# Patient Record
Sex: MALE | Race: BLACK OR AFRICAN AMERICAN | NOT HISPANIC OR LATINO | URBAN - METROPOLITAN AREA
[De-identification: names, ages, dates, MRNs, and addresses within clinical notes are randomized per-mention and may not be internally consistent; named-entity substitution may affect disease eponyms.]

---

## 2020-01-01 ENCOUNTER — OFFICE (OUTPATIENT)
Dept: URBAN - METROPOLITAN AREA CLINIC 19 | Facility: CLINIC | Age: 60
End: 2020-01-01
Payer: COMMERCIAL

## 2020-01-01 ENCOUNTER — AMBULATORY SURGICAL CENTER (OUTPATIENT)
Dept: URBAN - METROPOLITAN AREA SURGERY 2 | Facility: SURGERY | Age: 60
End: 2020-01-01
Payer: COMMERCIAL

## 2020-01-01 ENCOUNTER — AMBULATORY SURGICAL CENTER (OUTPATIENT)
Dept: URBAN - METROPOLITAN AREA SURGERY 2 | Facility: SURGERY | Age: 60
End: 2020-01-01

## 2020-01-01 VITALS
DIASTOLIC BLOOD PRESSURE: 60 MMHG | SYSTOLIC BLOOD PRESSURE: 102 MMHG | OXYGEN SATURATION: 91 % | HEART RATE: 74 BPM | HEIGHT: 65 IN | WEIGHT: 97 LBS

## 2020-01-01 DIAGNOSIS — K63.89 OTHER SPECIFIED DISEASES OF INTESTINE: ICD-10-CM

## 2020-01-01 DIAGNOSIS — K86.1 OTHER CHRONIC PANCREATITIS: ICD-10-CM

## 2020-01-01 DIAGNOSIS — R63.4 ABNORMAL WEIGHT LOSS: ICD-10-CM

## 2020-01-01 DIAGNOSIS — K70.30 ALCOHOLIC CIRRHOSIS OF LIVER WITHOUT ASCITES: ICD-10-CM

## 2020-01-01 DIAGNOSIS — D63.8 ANEMIA IN OTHER CHRONIC DISEASES CLASSIFIED ELSEWHERE: ICD-10-CM

## 2020-01-01 DIAGNOSIS — Z80.0 FAMILY HISTORY OF MALIGNANT NEOPLASM OF DIGESTIVE ORGANS: ICD-10-CM

## 2020-01-01 DIAGNOSIS — K21.9 GASTRO-ESOPHAGEAL REFLUX DISEASE WITHOUT ESOPHAGITIS: ICD-10-CM

## 2020-01-01 DIAGNOSIS — K52.9 NONINFECTIVE GASTROENTERITIS AND COLITIS, UNSPECIFIED: ICD-10-CM

## 2020-01-01 DIAGNOSIS — R11.0 NAUSEA: ICD-10-CM

## 2020-01-01 DIAGNOSIS — K44.9 DIAPHRAGMATIC HERNIA WITHOUT OBSTRUCTION OR GANGRENE: ICD-10-CM

## 2020-01-01 DIAGNOSIS — U07.1 COVID-19: ICD-10-CM

## 2020-01-01 LAB
AFP, SERUM, TUMOR MARKER: 4.9 NG/ML (ref 0–8.3)
AMYLASE: 179 U/L — HIGH (ref 31–110)
CA 19-9: 104 U/ML — HIGH (ref 0–35)
CBC, PLATELET, NO DIFFERENTIAL: HEMATOCRIT: 26.5 % — LOW (ref 37.5–51)
CBC, PLATELET, NO DIFFERENTIAL: HEMOGLOBIN: 8.5 G/DL — LOW (ref 13–17.7)
CBC, PLATELET, NO DIFFERENTIAL: MCH: 32.1 PG (ref 26.6–33)
CBC, PLATELET, NO DIFFERENTIAL: MCHC: 32.1 G/DL (ref 31.5–35.7)
CBC, PLATELET, NO DIFFERENTIAL: MCV: 100 FL — HIGH (ref 79–97)
CBC, PLATELET, NO DIFFERENTIAL: PLATELETS: 139 X10E3/UL — LOW (ref 150–450)
CBC, PLATELET, NO DIFFERENTIAL: RBC: 2.65 X10E6/UL — CRITICAL LOW (ref 4.14–5.8)
CBC, PLATELET, NO DIFFERENTIAL: RDW: 14.5 % (ref 11.6–15.4)
CBC, PLATELET, NO DIFFERENTIAL: WBC: 3.8 X10E3/UL (ref 3.4–10.8)
COMP. METABOLIC PANEL (14): A/G RATIO: 1.3 (ref 1.2–2.2)
COMP. METABOLIC PANEL (14): ALBUMIN: 3.1 G/DL — LOW (ref 3.8–4.8)
COMP. METABOLIC PANEL (14): ALKALINE PHOSPHATASE: 160 IU/L — HIGH (ref 39–117)
COMP. METABOLIC PANEL (14): ALT (SGPT): 22 IU/L (ref 0–44)
COMP. METABOLIC PANEL (14): AST (SGOT): 42 IU/L — HIGH (ref 0–40)
COMP. METABOLIC PANEL (14): BILIRUBIN, TOTAL: 0.4 MG/DL (ref 0–1.2)
COMP. METABOLIC PANEL (14): BUN/CREATININE RATIO: 10 (ref 10–24)
COMP. METABOLIC PANEL (14): BUN: 11 MG/DL (ref 8–27)
COMP. METABOLIC PANEL (14): CALCIUM: 8.3 MG/DL — LOW (ref 8.6–10.2)
COMP. METABOLIC PANEL (14): CARBON DIOXIDE, TOTAL: 16 MMOL/L — LOW (ref 20–29)
COMP. METABOLIC PANEL (14): CHLORIDE: 106 MMOL/L (ref 96–106)
COMP. METABOLIC PANEL (14): CREATININE: 1.05 MG/DL (ref 0.76–1.27)
COMP. METABOLIC PANEL (14): EGFR IF AFRICN AM: 88 ML/MIN/1.73 (ref 59–?)
COMP. METABOLIC PANEL (14): EGFR IF NONAFRICN AM: 76 ML/MIN/1.73 (ref 59–?)
COMP. METABOLIC PANEL (14): GLOBULIN, TOTAL: 2.3 G/DL (ref 1.5–4.5)
COMP. METABOLIC PANEL (14): GLUCOSE: 86 MG/DL (ref 65–99)
COMP. METABOLIC PANEL (14): POTASSIUM: 4.5 MMOL/L (ref 3.5–5.2)
COMP. METABOLIC PANEL (14): PROTEIN, TOTAL: 5.4 G/DL — LOW (ref 6–8.5)
COMP. METABOLIC PANEL (14): SODIUM: 135 MMOL/L (ref 134–144)
LIPASE: 7 U/L — LOW (ref 13–78)
PROTHROMBIN TIME (PT): INR: 1.1 (ref 0.8–1.2)
PROTHROMBIN TIME (PT): PROTHROMBIN TIME: 11.8 SEC (ref 9.1–12)

## 2020-01-01 PROCEDURE — 99205 OFFICE O/P NEW HI 60 MIN: CPT

## 2020-01-01 PROCEDURE — 45330 DIAGNOSTIC SIGMOIDOSCOPY: CPT | Mod: 51 | Performed by: INTERNAL MEDICINE

## 2020-01-01 PROCEDURE — 43235 EGD DIAGNOSTIC BRUSH WASH: CPT | Performed by: INTERNAL MEDICINE

## 2020-01-01 RX ORDER — POLYETHYLENE GLYCOL 3350, SODIUM SULFATE, SODIUM CHLORIDE, POTASSIUM CHLORIDE, ASCORBIC ACID, SODIUM ASCORBATE 7.5-2.691G
KIT ORAL
Qty: 1 | Refills: 0 | Status: COMPLETED
Start: 2020-01-01 | End: 2020-01-01

## 2020-08-20 NOTE — SERVICEHPINOTES
60-year-old single black male with multiple chronic GI conditions here with his niece for evaluation of chronic unintentional weight loss and anorexia.  He is a former patient of gastroenterologist, Monica Helm MD at [facility], but has not been seen by GI reportedly since 2017 as he no longer takes his insurance. He has a history of Laennec cirrhosis status post "many years" of drinking 1.5 pints of Vodka daily. He is now "only" drinking a glass of wine daily. He has a history of chronic pancreatitis of which he reportedly had a significant flare last in 2016 requiring what sounds like a biliary stent placement. He is currently on Creon. he had a total abdominal colectomy with diverting loop ileostomy for "diverticulitis." this was done by surgeon German Hawkins MD with a loop ileostomy closure on 2/9/12. He last had an EGD/colonoscopy with Dr. Helm on 8/18/16 which found a hiatal hernia and proctitis. Biopsies were negative for acute and chronic colitis.  He also apparently had prostate cancer in 2015 for which he received radiation therapy (no adjuvant therapy).  He cannot remember the name of his oncologist at [facility] (records from [facility] requested).   He has apparently had around 35 lb weight loss in the last 6 months and anorexia.  He has had frequent lab work drawn at [facility] (where he works) and recent CT abdomen/pelvis 7/28/20 found “nonspecific hyper enhancement of perirectal distribution with hypervascularity." Labwork at that time 7/28/20 found normocytic anemia (Hct=30.5%), elevated ALP (191), elevated AST (49), hyponatremia (Yb=619), decreased total protein (5.8) and albumin (2.8).  He is currently on omeprazole 40 mg BID for reflux.  He has Zofran 4 mg PRN and Phenergan 25 mg PRN for chronic nausea. He also takes daily iron for his anemia.  He is also on Lasix 20 mg  and Aldactone 50 mg daily.  He denies breakthrough reflux, dysphagia, abdominal pain or overt GI bleeding.  He has chronic diarrhea, but this is managed when he takes his Creon 3 times a day.CT chest/abd/pelvis 7/28/20 found a 5 mm RUL pulmonary nodule, chrolnic liver disease w/ small amount of ascites, "nonspecific perirectalperianal inflammation" and bilateral kidney stones.Of additional note, he tested positive for Covid-19 on 8/3/20, but was clear to return to work on 8/17/20.  His brother had esophageal cancer at 46.

## 2020-08-24 PROBLEM — K44.9 DIAPHRAGMATIC HERNIA WITHOUT OBSTRUCTION OR GANGRENE: Status: ACTIVE | Noted: 2020-01-01

## 2020-08-24 PROBLEM — K63.89 OTHER SPECIFIED DISEASES OF INTESTINE: Status: ACTIVE | Noted: 2020-01-01

## 2021-01-01 ENCOUNTER — OFFICE (OUTPATIENT)
Dept: URBAN - METROPOLITAN AREA CLINIC 19 | Facility: CLINIC | Age: 61
End: 2021-01-01

## 2021-01-01 ENCOUNTER — OFFICE (OUTPATIENT)
Dept: URBAN - METROPOLITAN AREA CLINIC 19 | Facility: CLINIC | Age: 61
End: 2021-01-01
Payer: COMMERCIAL

## 2021-01-01 VITALS
SYSTOLIC BLOOD PRESSURE: 109 MMHG | WEIGHT: 96 LBS | DIASTOLIC BLOOD PRESSURE: 66 MMHG | HEART RATE: 85 BPM | HEIGHT: 65 IN | OXYGEN SATURATION: 97 %

## 2021-01-01 DIAGNOSIS — R63.4 ABNORMAL WEIGHT LOSS: ICD-10-CM

## 2021-01-01 DIAGNOSIS — K70.30 ALCOHOLIC CIRRHOSIS OF LIVER WITHOUT ASCITES: ICD-10-CM

## 2021-01-01 DIAGNOSIS — R19.4 CHANGE IN BOWEL HABIT: ICD-10-CM

## 2021-01-01 DIAGNOSIS — F10.20 ALCOHOL DEPENDENCE, UNCOMPLICATED: ICD-10-CM

## 2021-01-01 DIAGNOSIS — D63.8 ANEMIA IN OTHER CHRONIC DISEASES CLASSIFIED ELSEWHERE: ICD-10-CM

## 2021-01-01 DIAGNOSIS — K86.1 OTHER CHRONIC PANCREATITIS: ICD-10-CM

## 2021-01-01 DIAGNOSIS — K21.9 GASTRO-ESOPHAGEAL REFLUX DISEASE WITHOUT ESOPHAGITIS: ICD-10-CM

## 2021-01-01 DIAGNOSIS — Z80.0 FAMILY HISTORY OF MALIGNANT NEOPLASM OF DIGESTIVE ORGANS: ICD-10-CM

## 2021-01-01 LAB
AFP, SERUM, TUMOR MARKER: 4.8 NG/ML (ref 0–8.3)
CBC, PLATELET, NO DIFFERENTIAL: HEMATOCRIT: 27.8 % — LOW (ref 37.5–51)
CBC, PLATELET, NO DIFFERENTIAL: HEMOGLOBIN: 9.3 G/DL — LOW (ref 13–17.7)
CBC, PLATELET, NO DIFFERENTIAL: MCH: 33.2 PG — HIGH (ref 26.6–33)
CBC, PLATELET, NO DIFFERENTIAL: MCHC: 33.5 G/DL (ref 31.5–35.7)
CBC, PLATELET, NO DIFFERENTIAL: MCV: 99 FL — HIGH (ref 79–97)
CBC, PLATELET, NO DIFFERENTIAL: NRBC: 1 % — HIGH (ref 0–0)
CBC, PLATELET, NO DIFFERENTIAL: PLATELETS: 133 X10E3/UL — LOW (ref 150–450)
CBC, PLATELET, NO DIFFERENTIAL: RBC: 2.8 X10E6/UL — LOW (ref 4.14–5.8)
CBC, PLATELET, NO DIFFERENTIAL: RDW: 14 % (ref 11.6–15.4)
CBC, PLATELET, NO DIFFERENTIAL: WBC: 8.7 X10E3/UL (ref 3.4–10.8)
COMP. METABOLIC PANEL (14): A/G RATIO: 1 — LOW (ref 1.2–2.2)
COMP. METABOLIC PANEL (14): ALBUMIN: 3 G/DL — LOW (ref 3.8–4.8)
COMP. METABOLIC PANEL (14): ALKALINE PHOSPHATASE: 218 IU/L — HIGH (ref 39–117)
COMP. METABOLIC PANEL (14): ALT (SGPT): 24 IU/L (ref 0–44)
COMP. METABOLIC PANEL (14): AST (SGOT): 43 IU/L — HIGH (ref 0–40)
COMP. METABOLIC PANEL (14): BILIRUBIN, TOTAL: 0.4 MG/DL (ref 0–1.2)
COMP. METABOLIC PANEL (14): BUN/CREATININE RATIO: 9 — LOW (ref 10–24)
COMP. METABOLIC PANEL (14): BUN: 14 MG/DL (ref 8–27)
COMP. METABOLIC PANEL (14): CALCIUM: 8.2 MG/DL — LOW (ref 8.6–10.2)
COMP. METABOLIC PANEL (14): CARBON DIOXIDE, TOTAL: 15 MMOL/L — LOW (ref 20–29)
COMP. METABOLIC PANEL (14): CHLORIDE: 101 MMOL/L (ref 96–106)
COMP. METABOLIC PANEL (14): CREATININE: 1.6 MG/DL — HIGH (ref 0.76–1.27)
COMP. METABOLIC PANEL (14): EGFR IF AFRICN AM: 53 ML/MIN/1.73 — LOW (ref 59–?)
COMP. METABOLIC PANEL (14): EGFR IF NONAFRICN AM: 46 ML/MIN/1.73 — LOW (ref 59–?)
COMP. METABOLIC PANEL (14): GLOBULIN, TOTAL: 2.9 G/DL (ref 1.5–4.5)
COMP. METABOLIC PANEL (14): GLUCOSE: 143 MG/DL — HIGH (ref 65–99)
COMP. METABOLIC PANEL (14): POTASSIUM: 4 MMOL/L (ref 3.5–5.2)
COMP. METABOLIC PANEL (14): PROTEIN, TOTAL: 5.9 G/DL — LOW (ref 6–8.5)
COMP. METABOLIC PANEL (14): SODIUM: 129 MMOL/L — LOW (ref 134–144)
PROTHROMBIN TIME (PT): INR: 1.3 — HIGH (ref 0.9–1.2)
PROTHROMBIN TIME (PT): PROTHROMBIN TIME: 13.4 SEC — HIGH (ref 9.1–12)

## 2021-01-01 PROCEDURE — 76700 US EXAM ABDOM COMPLETE: CPT

## 2021-01-01 PROCEDURE — 99215 OFFICE O/P EST HI 40 MIN: CPT

## 2021-01-01 RX ORDER — MEGESTROL ACETATE 40 MG/ML
16000 SUSPENSION ORAL
Qty: 1000 | Refills: 5 | Status: ACTIVE
Start: 2020-01-01

## 2021-01-19 NOTE — SERVICEHPINOTES
60-year-old single black male with multiple chronic GI conditions returns with his niece for routine follow-up his Laennec cirrhosis and anorexia. I initially saw him on 8/20/20 for evaluation of chronic unintentional weight loss and anorexia. He is a former patient of gastroenterologist, Monica Helm MD at [facility], but had not been seen by GI reportedly since 2017 as he no longer takes his insurance. A 4mm LISA pulmonary nodule (RADS category 1-S) was seen on CT chest 10/28/20. CT chest 7/28/20 found a 5 mm nodule RUL.  He has a history of Laennec cirrhosis due to "many years" of drinking 1.5 pints of Vodka daily. He was at the time "only" drinking a glass of wine daily. He has a history of chronic pancreatitis of which he reportedly had a significant flare last in 2016 requiring what sounds like a biliary stent placement. He is currently on Creon. he had a total abdominal colectomy with diverting loop ileostomy for "diverticulitis." This was done by surgeon German Hawkins MD with a loop ileostomy closure on 2/9/12. He had an EGD/colonoscopy with Dr. Helm on 8/18/16 which found a hiatal hernia and proctitis. Biopsies were negative for acute and chronic colitis. He had prostate cancer in 2015 for which he received radiation therapy (no adjuvant therapy). He is followed by Dale Vazquez MD. He apparently had around 35 lb weight loss and anorexia over the 6 months prior to my seeing him. He was having frequent lab work drawn at [facility] (where he works) and CT chest/abdomen/pelvis 7/28/20 found “nonspecific hyper enhancement of perirectal distribution with hypervascularity." Labwork at that time 7/28/20 found normocytic anemia (Hct=30.5%), elevated ALP (191), elevated AST (49), hyponatremia (Xo=131), decreased total protein (5.8) and albumin (2.8). He takes omeprazole 40 mg BID for reflux. He was on Zofran 4 mg PRN and Phenergan 25 mg PRN for chronic nausea. He also takes daily iron for his anemia. He was also on Lasix 20 mg and Aldactone 50 mg daily. He has chronic diarrhea, but this is managed when he takes his Creon 3 times a day.  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  MRI pancreatic mass protocol 9/10/20 was unremarkable.  CT of the chest 10/28/20 was also unremarkable. We started him on Megace for his anorexia and weight loss.  He has not been to gain weight since he was last here, but his weight has remained stable. He is on omeprazole 40 milligrams BID for reflux, Lasix 20 milligram and Aldactone 50 milligrams daily, and Creon 3 times daily.  His chronic nausea has reportedly resolved and he no longer takes Zofran or Phenergan except on rare occasions.  He was followed by hematologist, Dr. Vazquez for his anemia.  He takes an iron supplement daily.  He denies dysphagia, abdominal pain or overt GI bleeding.  He has actually had some mild constipation over the last several weeks.  Drink a bottle of magnesium citrate because of abdominal cramping and diarrhea.  Overall, he reports doing “much better than when was last here.”  He does drink 2-3 glasses of wine 2-3 days a week.Of additional note, he tested positive for Covid-19 on 8/3/20, but was cleared to return to work on 8/17/20. His brother had esophageal cancer at 46.

## 2021-01-19 NOTE — SERVICENOTES
The patient's assessment was reviewed with Dr. Hopkins and a collaborative plan of care was established.